# Patient Record
Sex: MALE | Race: WHITE | Employment: STUDENT | URBAN - NONMETROPOLITAN AREA
[De-identification: names, ages, dates, MRNs, and addresses within clinical notes are randomized per-mention and may not be internally consistent; named-entity substitution may affect disease eponyms.]

---
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
## 2025-06-21 ENCOUNTER — HOSPITAL ENCOUNTER (EMERGENCY)
Facility: HOSPITAL | Age: 25
Discharge: HOME | End: 2025-06-21
Attending: EMERGENCY MEDICINE | Admitting: EMERGENCY MEDICINE

## 2025-06-21 VITALS
HEART RATE: 75 BPM | TEMPERATURE: 98.4 F | OXYGEN SATURATION: 100 % | SYSTOLIC BLOOD PRESSURE: 116 MMHG | DIASTOLIC BLOOD PRESSURE: 62 MMHG | RESPIRATION RATE: 16 BRPM

## 2025-06-21 DIAGNOSIS — T67.5XXA HEAT EXHAUSTION, INITIAL ENCOUNTER: ICD-10-CM

## 2025-06-21 LAB
ANION GAP SERPL CALCULATED.3IONS-SCNC: 11 MMOL/L (ref 7–15)
BASOPHILS # BLD AUTO: 0.1 10E3/UL (ref 0–0.2)
BASOPHILS NFR BLD AUTO: 1 %
BUN SERPL-MCNC: 12.2 MG/DL (ref 6–20)
CALCIUM SERPL-MCNC: 9.3 MG/DL (ref 8.8–10.4)
CHLORIDE SERPL-SCNC: 101 MMOL/L (ref 98–107)
CREAT SERPL-MCNC: 0.95 MG/DL (ref 0.67–1.17)
EGFRCR SERPLBLD CKD-EPI 2021: >90 ML/MIN/1.73M2
EOSINOPHIL # BLD AUTO: 0.1 10E3/UL (ref 0–0.7)
EOSINOPHIL NFR BLD AUTO: 2 %
ERYTHROCYTE [DISTWIDTH] IN BLOOD BY AUTOMATED COUNT: 12.2 % (ref 10–15)
GLUCOSE SERPL-MCNC: 84 MG/DL (ref 70–99)
HCO3 SERPL-SCNC: 25 MMOL/L (ref 22–29)
HCT VFR BLD AUTO: 47.8 % (ref 40–53)
HGB BLD-MCNC: 15.9 G/DL (ref 13.3–17.7)
IMM GRANULOCYTES # BLD: 0.1 10E3/UL
IMM GRANULOCYTES NFR BLD: 1 %
LYMPHOCYTES # BLD AUTO: 2 10E3/UL (ref 0.8–5.3)
LYMPHOCYTES NFR BLD AUTO: 26 %
MAGNESIUM SERPL-MCNC: 1.8 MG/DL (ref 1.7–2.3)
MCH RBC QN AUTO: 30.2 PG (ref 26.5–33)
MCHC RBC AUTO-ENTMCNC: 33.3 G/DL (ref 31.5–36.5)
MCV RBC AUTO: 91 FL (ref 78–100)
MONOCYTES # BLD AUTO: 0.7 10E3/UL (ref 0–1.3)
MONOCYTES NFR BLD AUTO: 9 %
NEUTROPHILS # BLD AUTO: 4.9 10E3/UL (ref 1.6–8.3)
NEUTROPHILS NFR BLD AUTO: 63 %
NRBC # BLD AUTO: 0 10E3/UL
NRBC BLD AUTO-RTO: 0 /100
PLATELET # BLD AUTO: 205 10E3/UL (ref 150–450)
POTASSIUM SERPL-SCNC: 4 MMOL/L (ref 3.4–5.3)
RBC # BLD AUTO: 5.27 10E6/UL (ref 4.4–5.9)
SODIUM SERPL-SCNC: 137 MMOL/L (ref 135–145)
WBC # BLD AUTO: 7.8 10E3/UL (ref 4–11)

## 2025-06-21 PROCEDURE — 85018 HEMOGLOBIN: CPT

## 2025-06-21 PROCEDURE — 250N000011 HC RX IP 250 OP 636

## 2025-06-21 PROCEDURE — 83735 ASSAY OF MAGNESIUM: CPT

## 2025-06-21 PROCEDURE — 36415 COLL VENOUS BLD VENIPUNCTURE: CPT

## 2025-06-21 PROCEDURE — 258N000003 HC RX IP 258 OP 636

## 2025-06-21 PROCEDURE — 96374 THER/PROPH/DIAG INJ IV PUSH: CPT

## 2025-06-21 PROCEDURE — 96361 HYDRATE IV INFUSION ADD-ON: CPT

## 2025-06-21 PROCEDURE — 99284 EMERGENCY DEPT VISIT MOD MDM: CPT | Mod: 25

## 2025-06-21 PROCEDURE — 80048 BASIC METABOLIC PNL TOTAL CA: CPT

## 2025-06-21 RX ORDER — ONDANSETRON 2 MG/ML
4 INJECTION INTRAMUSCULAR; INTRAVENOUS ONCE
Status: COMPLETED | OUTPATIENT
Start: 2025-06-21 | End: 2025-06-21

## 2025-06-21 RX ADMIN — SODIUM CHLORIDE 1000 ML: 9 INJECTION, SOLUTION INTRAVENOUS at 18:47

## 2025-06-21 RX ADMIN — ONDANSETRON 4 MG: 2 INJECTION, SOLUTION INTRAMUSCULAR; INTRAVENOUS at 18:45

## 2025-06-21 ASSESSMENT — COLUMBIA-SUICIDE SEVERITY RATING SCALE - C-SSRS
1. IN THE PAST MONTH, HAVE YOU WISHED YOU WERE DEAD OR WISHED YOU COULD GO TO SLEEP AND NOT WAKE UP?: NO
2. HAVE YOU ACTUALLY HAD ANY THOUGHTS OF KILLING YOURSELF IN THE PAST MONTH?: NO
6. HAVE YOU EVER DONE ANYTHING, STARTED TO DO ANYTHING, OR PREPARED TO DO ANYTHING TO END YOUR LIFE?: NO

## 2025-06-21 ASSESSMENT — ACTIVITIES OF DAILY LIVING (ADL)
ADLS_ACUITY_SCORE: 41

## 2025-06-21 NOTE — ED PROVIDER NOTES
"I, Anupama Sibley have reviewed the documentation, personally taken the patient's history, performed an exam and agree with the physical finds, diagnosis and management plan.    HPI:  26 y/o M working outside as a , 95 degree heat. Feeling dizzy, lightheaded, sob when outside exerting self. Vapes, worried about \"popcorn lung.\"  Drinking water, cannot stop sweating, nausea.  Feel better when he goes indoors/air conditioning.     Physical Exam: Well-appearing male in no acute distress.  Regular rate, rhythm.  Normotensive.  No respiratory distress.  Abdomen soft nontender.  Wearing black long sleeve shirt, pants.  Normal gait.    MDM: Symptoms seem fairly classic for heat exhaustion.  My concern for underlying lung disease is quite low with his constellation of symptoms.  Dehydration, electrolyte abnormality on the differential.  Less likely arrhythmia.    ED Course/workup: Laboratory workup unremarkable.  Patient feels improved after fluids, antiemetics and will discharge to home with reassurance.             Final Diagnosis:     ICD-10-CM    1. Heat exhaustion, initial encounter  T67.5XXA               I personally saw the patient and performed a substantive portion of the visit including all aspects of the medical decision making.  I personally made/approved the management plan and take responsibility for the patient management.     MD Toñito Brenner Zabrina N, MD  06/21/25 1909    "

## 2025-06-21 NOTE — ED PROVIDER NOTES
"Emergency Department Encounter   NAME: Maverick Ramirez ; AGE: 25 year old male ; YOB: 2000 ; MRN: [id number] ; PCP: No primary care provider on file.   ED PROVIDER: Gloria Fallon PA-C    Evaluation Date & Time:   6/21/2025  5:55 PM    CHIEF COMPLAINT:  Dizziness      Impression and Plan   MDM: Maverick Ramirez is a 25 year old male who presents to the ED for evaluation of lightheadedness.  Patient states he has been outside for a while today while working, wearing long pants and a black shirt.  States he had 3 separate episodes of lightheadedness, no syncopal episodes or LOC.  Does have multiple small boxes of water throughout the day, but states he \"feels like I am cooking on the inside\".  States he has been sweating, little bit nauseous, but this fully resolves when he goes indoors. When outside, feels some shortness of breath \"because its so humid\" but reports this also resolves when indoors.     Patient is mildly hypertensive but otherwise vitally normal, no acute distress. Physical exam is significant for clear heart and lung sounds on auscultation, there is no tenderness to palpation of his abdomen.  There is no swelling or edema in BLE.  Mucous members are moist.  Patient is slightly damp, but not overtly sweating.  Neuroexam is intact.  PERRL, EOM intact.  Patient presentation sounds presyncopal in nature, likely due to to the heat index, exhaustion today, but differential diagnosis includes electrolyte abnormality, infection, poor kidney function.  Considered but no suspicion for ACS, pneumothorax, pleural effusion.    I independently reviewed lab work which is overall normal CBC, BMP, magnesium..    On reevaluation, patient reports significant improvement after Zofran and a liter of fluids.  We discussed that his lab work is overall normal, I am suspicious his symptoms are related to the heat today.  Continues to have no chest pain, no shortness of breath since arrival.  Repeat exam is normal.  We " discussed management of symptoms with stay indoors, drinking lots of fluids, getting plenty of rest.  patient is overall agreeable with this plan.  Stable for discharge at time of last evaluation.    Return precautions to the ED were discussed, patient verbalized understanding and were agreeable with the plan. All questions were answered.     Per chart review:  -Last seen in the ED in 2017  - Recent labs and imaging reviewed  - Care everywhere reviewed    Medical Decision Making      Discharge. No recommendations on prescription strength medication(s). N/A.    MIPS (CTPE, Dental pain, Huitron, Sinusitis, Asthma/COPD, Head Trauma): Not Applicable    SEPSIS: None        ED COURSE:  6:01 PM I met and introduced myself to the patient. I gathered initial history and performed my physical exam. We discussed plan for initial workup.   6:43 PM I have staffed the patient with Dr. Anupama Sibley, ED MD, who has evaluated the patient and agrees with all aspects of today's care.   7:15 PM I rechecked the patient and discussed results, discharge, follow up, and reasons to return to the ED.       FINAL IMPRESSION:    ICD-10-CM    1. Heat exhaustion, initial encounter  T67.5XXA             MEDICATIONS GIVEN IN THE EMERGENCY DEPARTMENT:  Medications   sodium chloride 0.9% BOLUS 1,000 mL (0 mLs Intravenous Stopped 6/21/25 2015)   ondansetron (ZOFRAN) injection 4 mg (4 mg Intravenous $Given 6/21/25 1845)         NEW PRESCRIPTIONS STARTED AT TODAY'S ED VISIT:  New Prescriptions    No medications on file         HPI   Use of Intrepreter: N/A     Maverick Ramirez is a 25 year old male with no significant medical history on file who presents to the ED by walk-in for evaluation of dizziness.     The patient tells me that he has been outside for a while today working, he works as a  and surveys properties, he is wearing long pants and a black shirt. He was doing okay when he was indoors. He has experienced three episodes of  "lightheadedness, no loss of consciousness. He did drink 2 small glasses and half a bottle of cold water of throughout his time working outside today, this did help somewhat cool him down while outside. States that \"it feels like I'm cooking on the inside\". On finishing his work he went back to his car which does not have good AC and noticed an onset of shortness of breath causing pain to his ribs on deep breaths. He feels like he has heartburn since the onset of his shortness of breath. His coworker dropped him off at the ED.     States that he began feel diaphoretic a few hours ago when he was outside. Denies abdominal pain or leg edema.       REVIEW OF SYSTEMS:  Pertinent positive and negative symptoms per HPI.       Medical History     Past Medical History:   Diagnosis Date    High ankle sprain 01/20/2017       No past surgical history on file.    No family history on file.    Social History     Tobacco Use    Smoking status: Never    Smokeless tobacco: Never   Substance Use Topics    Alcohol use: No    Drug use: Yes     Types: Marijuana     Comment: 2 months ago       No current outpatient medications on file.        Physical Exam     First Vitals:  Patient Vitals for the past 24 hrs:   BP Temp Temp src Pulse Resp SpO2   06/21/25 2000 116/62 -- -- 75 -- 100 %   06/21/25 1945 119/63 -- -- 73 -- 100 %   06/21/25 1930 125/69 -- -- 73 -- 100 %   06/21/25 1915 130/68 -- -- 71 -- 100 %   06/21/25 1752 (!) 142/73 98.4  F (36.9  C) Oral 78 16 98 %         PHYSICAL EXAM:   Physical Exam  Constitutional:       General: He is not in acute distress.     Appearance: Normal appearance. He is not toxic-appearing.   HENT:      Head: Atraumatic.      Right Ear: Tympanic membrane, ear canal and external ear normal.      Left Ear: Tympanic membrane, ear canal and external ear normal.      Nose: Nose normal. No congestion.      Mouth/Throat:      Mouth: Mucous membranes are moist.   Eyes:      General: No scleral icterus.     " Extraocular Movements: Extraocular movements intact.      Conjunctiva/sclera: Conjunctivae normal.      Pupils: Pupils are equal, round, and reactive to light.   Cardiovascular:      Rate and Rhythm: Normal rate and regular rhythm.      Heart sounds: Normal heart sounds.   Pulmonary:      Effort: Pulmonary effort is normal. No respiratory distress.      Breath sounds: Normal breath sounds. No wheezing or rales.   Abdominal:      General: There is no distension.      Palpations: Abdomen is soft.      Tenderness: There is no abdominal tenderness. There is no guarding.   Musculoskeletal:         General: No deformity.      Cervical back: Normal range of motion and neck supple. No rigidity.      Right lower leg: No edema.      Left lower leg: No edema.   Skin:     General: Skin is warm.      Capillary Refill: Capillary refill takes less than 2 seconds.   Neurological:      General: No focal deficit present.      Mental Status: He is alert and oriented to person, place, and time.   Psychiatric:         Mood and Affect: Mood normal.         Behavior: Behavior normal.             Results     LAB:  All pertinent labs reviewed and interpreted  Labs Ordered and Resulted from Time of ED Arrival to Time of ED Departure   BASIC METABOLIC PANEL - Normal       Result Value    Sodium 137      Potassium 4.0      Chloride 101      Carbon Dioxide (CO2) 25      Anion Gap 11      Urea Nitrogen 12.2      Creatinine 0.95      GFR Estimate >90      Calcium 9.3      Glucose 84     MAGNESIUM - Normal    Magnesium 1.8     CBC WITH PLATELETS AND DIFFERENTIAL    WBC Count 7.8      RBC Count 5.27      Hemoglobin 15.9      Hematocrit 47.8      MCV 91      MCH 30.2      MCHC 33.3      RDW 12.2      Platelet Count 205      % Neutrophils 63      % Lymphocytes 26      % Monocytes 9      % Eosinophils 2      % Basophils 1      % Immature Granulocytes 1      NRBCs per 100 WBC 0      Absolute Neutrophils 4.9      Absolute Lymphocytes 2.0      Absolute  Monocytes 0.7      Absolute Eosinophils 0.1      Absolute Basophils 0.1      Absolute Immature Granulocytes 0.1      Absolute NRBCs 0.0         RADIOLOGY:  No orders to display         ECG:  None    PROCEDURES:  None      I, Sarabjit Diaz, am serving as a scribe to document services personally performed by Gloria Fallon PA-C, based on my observation and the provider's statements to me. I, Gloria Fallon PA-C attest that Sarabjit Diaz is acting in a scribe capacity, has observed my performance of the services and has documented them in accordance with my direction.       Gloria Fallon PA-C   Emergency Medicine   [facility] EMERGENCY DEPARTMENT       Gloria Fallon PA-C  06/21/25 2016

## 2025-06-21 NOTE — ED TRIAGE NOTES
The patient reports feeling like he is going to pass out. He also reports pain in his lungs (but not pain with breathing). He was out in the heat for an extended period of time today. He reports hx of anxiety.

## 2025-06-22 NOTE — DISCHARGE INSTRUCTIONS
Your lab work looks great. Stay indoors, stay cool, drink plenty of fluids, get plenty of rest.